# Patient Record
Sex: FEMALE | Race: WHITE | URBAN - METROPOLITAN AREA
[De-identification: names, ages, dates, MRNs, and addresses within clinical notes are randomized per-mention and may not be internally consistent; named-entity substitution may affect disease eponyms.]

---
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
## 2025-01-03 ENCOUNTER — OFFICE VISIT (OUTPATIENT)
Dept: PRIMARY CARE CLINIC | Age: 20
End: 2025-01-03
Payer: COMMERCIAL

## 2025-01-03 VITALS
HEART RATE: 94 BPM | DIASTOLIC BLOOD PRESSURE: 72 MMHG | HEIGHT: 64 IN | BODY MASS INDEX: 21.17 KG/M2 | OXYGEN SATURATION: 99 % | WEIGHT: 124 LBS | SYSTOLIC BLOOD PRESSURE: 108 MMHG

## 2025-01-03 DIAGNOSIS — R00.0 TACHYCARDIA: ICD-10-CM

## 2025-01-03 DIAGNOSIS — Z23 NEED FOR HPV VACCINATION: ICD-10-CM

## 2025-01-03 DIAGNOSIS — Z00.00 GENERAL MEDICAL EXAMINATION: Primary | ICD-10-CM

## 2025-01-03 DIAGNOSIS — R42 DIZZINESS: ICD-10-CM

## 2025-01-03 PROCEDURE — 90471 IMMUNIZATION ADMIN: CPT | Performed by: FAMILY MEDICINE

## 2025-01-03 PROCEDURE — 90651 9VHPV VACCINE 2/3 DOSE IM: CPT | Performed by: FAMILY MEDICINE

## 2025-01-03 PROCEDURE — 99395 PREV VISIT EST AGE 18-39: CPT | Performed by: FAMILY MEDICINE

## 2025-01-03 RX ORDER — CHOLECALCIFEROL (VITAMIN D3) 25 MCG
1000 TABLET ORAL DAILY
COMMUNITY

## 2025-01-03 RX ORDER — METHYLPHENIDATE HYDROCHLORIDE 20 MG/1
CAPSULE, EXTENDED RELEASE ORAL
COMMUNITY
Start: 2024-12-07

## 2025-01-03 SDOH — ECONOMIC STABILITY: FOOD INSECURITY: WITHIN THE PAST 12 MONTHS, THE FOOD YOU BOUGHT JUST DIDN'T LAST AND YOU DIDN'T HAVE MONEY TO GET MORE.: NEVER TRUE

## 2025-01-03 SDOH — ECONOMIC STABILITY: FOOD INSECURITY: WITHIN THE PAST 12 MONTHS, YOU WORRIED THAT YOUR FOOD WOULD RUN OUT BEFORE YOU GOT MONEY TO BUY MORE.: NEVER TRUE

## 2025-01-03 SDOH — ECONOMIC STABILITY: INCOME INSECURITY: HOW HARD IS IT FOR YOU TO PAY FOR THE VERY BASICS LIKE FOOD, HOUSING, MEDICAL CARE, AND HEATING?: NOT HARD AT ALL

## 2025-01-03 ASSESSMENT — PATIENT HEALTH QUESTIONNAIRE - PHQ9
SUM OF ALL RESPONSES TO PHQ QUESTIONS 1-9: 0
1. LITTLE INTEREST OR PLEASURE IN DOING THINGS: NOT AT ALL
2. FEELING DOWN, DEPRESSED OR HOPELESS: NOT AT ALL
SUM OF ALL RESPONSES TO PHQ QUESTIONS 1-9: 0
SUM OF ALL RESPONSES TO PHQ QUESTIONS 1-9: 0
SUM OF ALL RESPONSES TO PHQ9 QUESTIONS 1 & 2: 0
SUM OF ALL RESPONSES TO PHQ QUESTIONS 1-9: 0

## 2025-01-03 NOTE — PROGRESS NOTES
Complete Physical/Wellness Visit for Women    Name: Dominga Hale  : 2005         Chief Complaint:     Chief Complaint   Patient presents with    Annual Exam    Dizziness     Couple times a week, last couple occurences have been worse and lasting longer    Other     KAVON Ok       History of Present Illness:    Dominga Hale is a 19 y.o.  female who presents with Annual Exam, Dizziness (Couple times a week, last couple occurences have been worse and lasting longer), and Other (KAVON Ok)      Last PAP smear was N/A  Last mammogram was N/A  She does not perform self breast exams at home.  Last DEXA was N/A  Colorectal cancer screening: N/A  Last blood work was 2023  Due for HM as follows: HPV vaccine #2    Does patient follow any particular diet? No  Does patient exercise on a regular basis? No  Does patient use tobacco products? No  How much alcohol do they consume in a week? No  Do they have concerns with their overall health today? Dizziness   Does the patient have any labs they need done for their insurance/job? No  Does the patient have any paperwork that needs filled out for their insurance/job? No    Does the patient have additional concerns they would like addressed today in addition to their physical/wellness visit? Dizziness - she has gained weight since last visit, and is now at a more healthy weight - but continues to get these episodes of dizziness. They've done some at-home testing for POTS and they do believe that is the cause. Worse with showers, and has tachycardia.     Past Medical History:     Past Medical History:   Diagnosis Date    ADHD (attention deficit hyperactivity disorder)       Reviewed all health maintenance requirements and ordered appropriate tests      Past Surgical History:    History reviewed. No pertinent surgical history.       Medications:     Outpatient Medications Prior to Visit   Medication Sig Dispense Refill    methylphenidate (RITALIN LA) 20 MG

## 2025-02-13 DIAGNOSIS — R00.0 TACHYCARDIA: Primary | ICD-10-CM
